# Patient Record
Sex: FEMALE | Employment: OTHER | URBAN - METROPOLITAN AREA
[De-identification: names, ages, dates, MRNs, and addresses within clinical notes are randomized per-mention and may not be internally consistent; named-entity substitution may affect disease eponyms.]

---

## 2021-02-23 VITALS — WEIGHT: 175 LBS | BODY MASS INDEX: 29.16 KG/M2 | HEIGHT: 65 IN

## 2021-02-23 RX ORDER — SODIUM CHLORIDE, SODIUM LACTATE, POTASSIUM CHLORIDE, CALCIUM CHLORIDE 600; 310; 30; 20 MG/100ML; MG/100ML; MG/100ML; MG/100ML
INJECTION, SOLUTION INTRAVENOUS CONTINUOUS
Status: CANCELLED | OUTPATIENT
Start: 2021-02-23

## 2021-02-23 RX ORDER — AMANTADINE HYDROCHLORIDE 100 MG/1
100 CAPSULE, GELATIN COATED ORAL 2 TIMES DAILY
COMMUNITY

## 2021-02-23 RX ORDER — LIDOCAINE HYDROCHLORIDE 10 MG/ML
0.5 INJECTION, SOLUTION EPIDURAL; INFILTRATION; INTRACAUDAL; PERINEURAL ONCE
Status: CANCELLED | OUTPATIENT
Start: 2021-02-23 | End: 2021-02-23

## 2021-02-23 RX ORDER — SELEGILINE HYDROCHLORIDE 5 MG/1
5 CAPSULE ORAL
COMMUNITY

## 2021-02-23 NOTE — PROGRESS NOTES
Name_______________________________________Printed:____________________  Date and time of surgery___3/23  1115_____________________Arrival Time:__0945______________   1. The instructions given regarding when and if a patient needs to stop oral intake prior to surgery varies. Follow the specific instructions you were given                  _x__Nothing to eat or to drink after Midnight the night before.                   ____Carbo loading or ERAS instructions will be given to select patients-if you have been given those instructions -please do the following                           The evening before your surgery after dinner before midnight drink 40 ounces of gatorade. If you are diabetic use sugar free. The morning of surgery drink 40 ounces of water. This needs to be finished 3 hours prior to your surgery start time. 2. Take the following pills with a small sip of water on the morning of surgery___symmetrel selegiline________________________________________________                  Do not take blood pressure medications ending in pril or sartan the edna prior to surgery or the morning of surgery_   3. Aspirin, Ibuprofen, Advil, Naproxen, Vitamin E and other Anti-inflammatory products and supplements should be stopped for 5 -7days before surgery or as directed by your physician. 4. Check with your Doctor regarding stopping Plavix, Coumadin,Eliquis, Lovenox,Effient,Pradaxa,Xarelto, Fragmin or other blood thinners and follow their instructions. 5. Do not smoke, and do not drink any alcoholic beverages 24 hours prior to surgery. This includes NA Beer. Refrain from the usage of any recreational drugs. 6. You may brush your teeth and gargle the morning of surgery. DO NOT SWALLOW WATER   7. You MUST make arrangements for a responsible adult to stay on site while you are here and take you home after your surgery. You will not be allowed to leave alone or drive yourself home.   It is strongly suggested someone stay with you the first 24 hrs. Your surgery will be cancelled if you do not have a ride home. 8. A parent/legal guardian must accompany a child scheduled for surgery and plan to stay at the hospital until the child is discharged. Please do not bring other children with you. 9. Please wear simple, loose fitting clothing to the hospital.  Gerri Roots not bring valuables (money, credit cards, checkbooks, etc.) Do not wear any makeup (including no eye makeup) or nail polish on your fingers or toes. 10. DO NOT wear any jewelry or piercings on day of surgery. All body piercing jewelry must be removed. 11. If you have ___dentures, they will be removed before going to the OR; we will provide you a container. If you wear ___contact lenses or ___glasses, they will be removed; please bring a case for them. 12. Please see your family doctor/pediatrician for a history & physical and/or concerning medications. Bring any test results/reports from your physician's office. PCP__________________Phone___________H&P Appt. Date________             13 If you  have a Living Will and Durable Power of  for Healthcare, please bring in a copy. 15. Notify your Surgeon if you develop any illness between now and surgery  time, cough, cold, fever, sore throat, nausea, vomiting, etc.  Please notify your surgeon if you experience dizziness, shortness of breath or blurred vision between now & the time of your surgery             15. DO NOT shave your operative site 96 hours prior to surgery. For face & neck surgery, men may use an electric razor 48 hours prior to surgery. 16. Shower the night before or morning of surgery using an antibacterial soap or as you have been instructed. 17. To provide excellent care visitors will be limited to one in the room at any given time. 18.  Please bring picture ID and insurance card.              19.  Visit our web site for additional information:  [email or web address]              20.During flu season no children under the age of 15 are permitted in the hospital for the safety of all patients. 21. If you take a long acting insulin in the evening only  take half of your usual  dose the night  before your procedure              22. If you use a c-pap please bring DOS if staying overnight,             23.For your convenience [facility] has a pharmacy on site to fill your prescriptions. 24. If you use oxygen and have a portable tank please bring it  with you the DOS             25. Bring a complete list of all your medications with name and dose include any supplements. 26. Other___pcp to make appt  3/17 pats_______________________________________   *Please call pre admission testing if you any further questions   Eloy Quiroz   [address]    [address]  [phone]   [facility]    __ Frank Julia _____  _x_ Scheduled _3/17__ Mart Frame to schedule__   __ Other __________      WIBZAEV POLICY(subject to change)    There is a one visitor policy at [facility] for all surgeries and endoscopies. Whether the visitor can stay or will be asked to wait in the car will depend on the current policy and if social distancing can be maintained. The policy is subject to change at any time. Please make sure the visitor has a cell phone that is on,charged and able to accept calls, as this may be the way that the staff communicates with them. Pain management is NO VISITOR policyThe patients ride is expected to remain in the car with a cell phone for communication. If the ride is leaving the hospital grounds please make sure they are back in time for pickup. Have the patient inform the staff on arrival what their rides plans are while the patient is in the facility. At the MAIN there is one visitor allowed. Please note that the visitor

## 2021-03-17 ENCOUNTER — OFFICE VISIT (OUTPATIENT)
Dept: PRIMARY CARE CLINIC | Age: 76
End: 2021-03-17
Payer: MEDICARE

## 2021-03-17 ENCOUNTER — HOSPITAL ENCOUNTER (OUTPATIENT)
Age: 76
Setting detail: OUTPATIENT SURGERY
End: 2021-03-17
Attending: NEUROLOGICAL SURGERY | Admitting: NEUROLOGICAL SURGERY
Payer: MEDICARE

## 2021-03-17 ENCOUNTER — HOSPITAL ENCOUNTER (OUTPATIENT)
Age: 76
Discharge: HOME | End: 2021-03-17
Attending: NEUROLOGICAL SURGERY
Payer: MEDICARE

## 2021-03-17 DIAGNOSIS — Z01.818 PREOP TESTING: ICD-10-CM

## 2021-03-17 DIAGNOSIS — Z01.818 PREOP TESTING: Primary | ICD-10-CM

## 2021-03-17 DIAGNOSIS — Z20.828 EXPOSURE TO SARS-ASSOCIATED CORONAVIRUS: Primary | ICD-10-CM

## 2021-03-17 LAB
ANION GAP SERPL CALCULATED.3IONS-SCNC: 13 MMOL/L (ref 3–16)
APTT: 38.3 SEC (ref 24.2–36.2)
BUN BLDV-MCNC: 31 MG/DL (ref 7–20)
CALCIUM SERPL-MCNC: 9.3 MG/DL (ref 8.3–10.6)
CHLORIDE BLD-SCNC: 106 MMOL/L (ref 99–110)
CO2: 20 MMOL/L (ref 21–32)
CREAT SERPL-MCNC: 1.5 MG/DL (ref 0.6–1.2)
EKG ATRIAL RATE: 55 BPM
EKG DIAGNOSIS: NORMAL
EKG P AXIS: 75 DEGREES
EKG P-R INTERVAL: 138 MS
EKG Q-T INTERVAL: 468 MS
EKG QRS DURATION: 96 MS
EKG QTC CALCULATION (BAZETT): 447 MS
EKG R AXIS: -23 DEGREES
EKG T AXIS: 33 DEGREES
EKG VENTRICULAR RATE: 55 BPM
GFR AFRICAN AMERICAN: 41
GFR NON-AFRICAN AMERICAN: 34
GLUCOSE BLD-MCNC: 103 MG/DL (ref 70–99)
HCT VFR BLD CALC: 42.3 % (ref 36–48)
HEMOGLOBIN: 14 G/DL (ref 12–16)
INR BLD: 1.14 (ref 0.86–1.14)
MCH RBC QN AUTO: 34.1 PG (ref 26–34)
MCHC RBC AUTO-ENTMCNC: 33 G/DL (ref 31–36)
MCV RBC AUTO: 103.4 FL (ref 80–100)
PDW BLD-RTO: 13.5 % (ref 12.4–15.4)
PLATELET # BLD: 106 K/UL (ref 135–450)
PMV BLD AUTO: 12.6 FL (ref 5–10.5)
POTASSIUM SERPL-SCNC: 4.5 MMOL/L (ref 3.5–5.1)
PROTHROMBIN TIME: 13.3 SEC (ref 10–13.2)
RBC # BLD: 4.09 M/UL (ref 4–5.2)
SODIUM BLD-SCNC: 139 MMOL/L (ref 136–145)
WBC # BLD: 5.6 K/UL (ref 4–11)

## 2021-03-17 PROCEDURE — 93010 ELECTROCARDIOGRAM REPORT: CPT | Performed by: INTERNAL MEDICINE

## 2021-03-17 PROCEDURE — 99211 OFF/OP EST MAY X REQ PHY/QHP: CPT | Performed by: NURSE PRACTITIONER

## 2021-03-17 PROCEDURE — 80048 BASIC METABOLIC PNL TOTAL CA: CPT

## 2021-03-17 PROCEDURE — 93005 ELECTROCARDIOGRAM TRACING: CPT | Performed by: NEUROLOGICAL SURGERY

## 2021-03-17 PROCEDURE — 36415 COLL VENOUS BLD VENIPUNCTURE: CPT

## 2021-03-17 PROCEDURE — 85610 PROTHROMBIN TIME: CPT

## 2021-03-17 PROCEDURE — G8419 CALC BMI OUT NRM PARAM NOF/U: HCPCS | Performed by: NURSE PRACTITIONER

## 2021-03-17 PROCEDURE — G8428 CUR MEDS NOT DOCUMENT: HCPCS | Performed by: NURSE PRACTITIONER

## 2021-03-17 PROCEDURE — 85730 THROMBOPLASTIN TIME PARTIAL: CPT

## 2021-03-17 PROCEDURE — 85027 COMPLETE CBC AUTOMATED: CPT

## 2021-03-18 LAB — SARS-COV-2, PCR: NOT DETECTED

## 2021-03-22 ENCOUNTER — ANESTHESIA EVENT (OUTPATIENT)
Dept: OPERATING ROOM | Age: 76
End: 2021-03-22
Payer: MEDICARE

## 2021-03-22 NOTE — PROGRESS NOTES
Gely Mosqueda received a viral test for COVID-19. They were educated on isolation and quarantine as appropriate. For any symptoms, they were directed to seek care from their PCP, given contact information to establish with a doctor, directed to an urgent care or the emergency room.

## 2021-03-23 ENCOUNTER — APPOINTMENT (OUTPATIENT)
Dept: GENERAL RADIOLOGY | Age: 76
End: 2021-03-23
Attending: NEUROLOGICAL SURGERY
Payer: MEDICARE

## 2021-03-23 ENCOUNTER — ANESTHESIA (OUTPATIENT)
Dept: OPERATING ROOM | Age: 76
End: 2021-03-23
Payer: MEDICARE

## 2021-03-23 ENCOUNTER — HOSPITAL ENCOUNTER (OUTPATIENT)
Age: 76
Setting detail: OBSERVATION
Discharge: HOME | End: 2021-03-24
Attending: NEUROLOGICAL SURGERY | Admitting: NEUROLOGICAL SURGERY
Payer: MEDICARE

## 2021-03-23 VITALS
RESPIRATION RATE: 10 BRPM | TEMPERATURE: 96.3 F | OXYGEN SATURATION: 100 % | DIASTOLIC BLOOD PRESSURE: 90 MMHG | SYSTOLIC BLOOD PRESSURE: 202 MMHG

## 2021-03-23 DIAGNOSIS — M48.062 SPINAL STENOSIS OF LUMBAR REGION WITH NEUROGENIC CLAUDICATION: Primary | ICD-10-CM

## 2021-03-23 PROBLEM — K21.9 GERD (GASTROESOPHAGEAL REFLUX DISEASE): Status: ACTIVE | Noted: 2021-03-23

## 2021-03-23 PROBLEM — G20 PARKINSONISM (HCC): Status: ACTIVE | Noted: 2021-03-23

## 2021-03-23 PROBLEM — I10 HTN (HYPERTENSION): Status: ACTIVE | Noted: 2021-03-23

## 2021-03-23 PROCEDURE — 2500000003 HC RX 250 WO HCPCS: Performed by: NEUROLOGICAL SURGERY

## 2021-03-23 PROCEDURE — 6360000002 HC RX W HCPCS: Performed by: REGISTERED NURSE

## 2021-03-23 PROCEDURE — 3600000004 HC SURGERY LEVEL 4 BASE: Performed by: NEUROLOGICAL SURGERY

## 2021-03-23 PROCEDURE — 7100000000 HC PACU RECOVERY - FIRST 15 MIN: Performed by: NEUROLOGICAL SURGERY

## 2021-03-23 PROCEDURE — 2720000010 HC SURG SUPPLY STERILE: Performed by: NEUROLOGICAL SURGERY

## 2021-03-23 PROCEDURE — 94760 N-INVAS EAR/PLS OXIMETRY 1: CPT

## 2021-03-23 PROCEDURE — 3700000001 HC ADD 15 MINUTES (ANESTHESIA): Performed by: NEUROLOGICAL SURGERY

## 2021-03-23 PROCEDURE — G0378 HOSPITAL OBSERVATION PER HR: HCPCS

## 2021-03-23 PROCEDURE — 3209999900 FLUORO FOR SURGICAL PROCEDURES

## 2021-03-23 PROCEDURE — 2580000003 HC RX 258: Performed by: NEUROLOGICAL SURGERY

## 2021-03-23 PROCEDURE — 6360000002 HC RX W HCPCS: Performed by: NEUROLOGICAL SURGERY

## 2021-03-23 PROCEDURE — 3600000014 HC SURGERY LEVEL 4 ADDTL 15MIN: Performed by: NEUROLOGICAL SURGERY

## 2021-03-23 PROCEDURE — 6370000000 HC RX 637 (ALT 250 FOR IP): Performed by: NEUROLOGICAL SURGERY

## 2021-03-23 PROCEDURE — 2500000003 HC RX 250 WO HCPCS

## 2021-03-23 PROCEDURE — 6360000002 HC RX W HCPCS: Performed by: ANESTHESIOLOGY

## 2021-03-23 PROCEDURE — 2709999900 HC NON-CHARGEABLE SUPPLY: Performed by: NEUROLOGICAL SURGERY

## 2021-03-23 PROCEDURE — 2500000003 HC RX 250 WO HCPCS: Performed by: REGISTERED NURSE

## 2021-03-23 PROCEDURE — 72020 X-RAY EXAM OF SPINE 1 VIEW: CPT

## 2021-03-23 PROCEDURE — 7100000001 HC PACU RECOVERY - ADDTL 15 MIN: Performed by: NEUROLOGICAL SURGERY

## 2021-03-23 PROCEDURE — 3700000000 HC ANESTHESIA ATTENDED CARE: Performed by: NEUROLOGICAL SURGERY

## 2021-03-23 RX ORDER — HYDROMORPHONE HYDROCHLORIDE 1 MG/ML
0.5 INJECTION, SOLUTION INTRAMUSCULAR; INTRAVENOUS; SUBCUTANEOUS
Status: DISCONTINUED | OUTPATIENT
Start: 2021-03-23 | End: 2021-03-24 | Stop reason: HOSPADM

## 2021-03-23 RX ORDER — PANTOPRAZOLE SODIUM 40 MG/1
40 TABLET, DELAYED RELEASE ORAL EVERY MORNING
Status: DISCONTINUED | OUTPATIENT
Start: 2021-03-24 | End: 2021-03-24

## 2021-03-23 RX ORDER — MAGNESIUM HYDROXIDE 1200 MG/15ML
LIQUID ORAL CONTINUOUS PRN
Status: COMPLETED | OUTPATIENT
Start: 2021-03-23 | End: 2021-03-23

## 2021-03-23 RX ORDER — POLYETHYLENE GLYCOL 3350 17 G/17G
17 POWDER, FOR SOLUTION ORAL DAILY
Status: DISCONTINUED | OUTPATIENT
Start: 2021-03-23 | End: 2021-03-24 | Stop reason: HOSPADM

## 2021-03-23 RX ORDER — METHYLPREDNISOLONE ACETATE 40 MG/ML
INJECTION, SUSPENSION INTRA-ARTICULAR; INTRALESIONAL; INTRAMUSCULAR; SOFT TISSUE
Status: COMPLETED | OUTPATIENT
Start: 2021-03-23 | End: 2021-03-23

## 2021-03-23 RX ORDER — SELEGILINE HYDROCHLORIDE 5 MG/1
5 CAPSULE ORAL
Status: DISCONTINUED | OUTPATIENT
Start: 2021-03-24 | End: 2021-03-24 | Stop reason: HOSPADM

## 2021-03-23 RX ORDER — CARVEDILOL 3.12 MG/1
3.12 TABLET ORAL 2 TIMES DAILY WITH MEALS
Status: DISCONTINUED | OUTPATIENT
Start: 2021-03-23 | End: 2021-03-24 | Stop reason: HOSPADM

## 2021-03-23 RX ORDER — HYDRALAZINE HYDROCHLORIDE 20 MG/ML
5 INJECTION INTRAMUSCULAR; INTRAVENOUS EVERY 10 MIN PRN
Status: DISCONTINUED | OUTPATIENT
Start: 2021-03-23 | End: 2021-03-23 | Stop reason: HOSPADM

## 2021-03-23 RX ORDER — HYDROMORPHONE HCL 110MG/55ML
PATIENT CONTROLLED ANALGESIA SYRINGE INTRAVENOUS PRN
Status: DISCONTINUED | OUTPATIENT
Start: 2021-03-23 | End: 2021-03-23 | Stop reason: SDUPTHER

## 2021-03-23 RX ORDER — HYDROMORPHONE HCL 110MG/55ML
0.5 PATIENT CONTROLLED ANALGESIA SYRINGE INTRAVENOUS EVERY 5 MIN PRN
Status: DISCONTINUED | OUTPATIENT
Start: 2021-03-23 | End: 2021-03-23 | Stop reason: HOSPADM

## 2021-03-23 RX ORDER — FENTANYL CITRATE 50 UG/ML
INJECTION, SOLUTION INTRAMUSCULAR; INTRAVENOUS PRN
Status: DISCONTINUED | OUTPATIENT
Start: 2021-03-23 | End: 2021-03-23 | Stop reason: SDUPTHER

## 2021-03-23 RX ORDER — LIDOCAINE HYDROCHLORIDE 10 MG/ML
1 INJECTION, SOLUTION EPIDURAL; INFILTRATION; INTRACAUDAL; PERINEURAL
Status: DISCONTINUED | OUTPATIENT
Start: 2021-03-23 | End: 2021-03-23 | Stop reason: HOSPADM

## 2021-03-23 RX ORDER — SODIUM CHLORIDE 0.9 % (FLUSH) 0.9 %
10 SYRINGE (ML) INJECTION EVERY 12 HOURS SCHEDULED
Status: DISCONTINUED | OUTPATIENT
Start: 2021-03-23 | End: 2021-03-24 | Stop reason: HOSPADM

## 2021-03-23 RX ORDER — GLYCOPYRROLATE 0.2 MG/ML
INJECTION INTRAMUSCULAR; INTRAVENOUS PRN
Status: DISCONTINUED | OUTPATIENT
Start: 2021-03-23 | End: 2021-03-23 | Stop reason: SDUPTHER

## 2021-03-23 RX ORDER — OXYCODONE HYDROCHLORIDE AND ACETAMINOPHEN 5; 325 MG/1; MG/1
1 TABLET ORAL
Status: DISCONTINUED | OUTPATIENT
Start: 2021-03-23 | End: 2021-03-23 | Stop reason: HOSPADM

## 2021-03-23 RX ORDER — OXYCODONE HYDROCHLORIDE 5 MG/1
10 TABLET ORAL EVERY 4 HOURS PRN
Status: DISCONTINUED | OUTPATIENT
Start: 2021-03-23 | End: 2021-03-24 | Stop reason: HOSPADM

## 2021-03-23 RX ORDER — LABETALOL HYDROCHLORIDE 5 MG/ML
5 INJECTION, SOLUTION INTRAVENOUS EVERY 10 MIN PRN
Status: DISCONTINUED | OUTPATIENT
Start: 2021-03-23 | End: 2021-03-23 | Stop reason: HOSPADM

## 2021-03-23 RX ORDER — SODIUM CHLORIDE 0.9 % (FLUSH) 0.9 %
10 SYRINGE (ML) INJECTION PRN
Status: DISCONTINUED | OUTPATIENT
Start: 2021-03-23 | End: 2021-03-23 | Stop reason: HOSPADM

## 2021-03-23 RX ORDER — DEXAMETHASONE SODIUM PHOSPHATE 4 MG/ML
INJECTION, SOLUTION INTRA-ARTICULAR; INTRALESIONAL; INTRAMUSCULAR; INTRAVENOUS; SOFT TISSUE PRN
Status: DISCONTINUED | OUTPATIENT
Start: 2021-03-23 | End: 2021-03-23 | Stop reason: SDUPTHER

## 2021-03-23 RX ORDER — DEXTROSE, SODIUM CHLORIDE, AND POTASSIUM CHLORIDE 5; .45; .15 G/100ML; G/100ML; G/100ML
1000 INJECTION INTRAVENOUS CONTINUOUS
Status: DISCONTINUED | OUTPATIENT
Start: 2021-03-23 | End: 2021-03-24

## 2021-03-23 RX ORDER — TIZANIDINE 4 MG/1
4 TABLET ORAL EVERY 6 HOURS PRN
Status: DISCONTINUED | OUTPATIENT
Start: 2021-03-23 | End: 2021-03-24 | Stop reason: HOSPADM

## 2021-03-23 RX ORDER — PROMETHAZINE HYDROCHLORIDE 25 MG/1
12.5 TABLET ORAL EVERY 6 HOURS PRN
Status: DISCONTINUED | OUTPATIENT
Start: 2021-03-23 | End: 2021-03-24 | Stop reason: HOSPADM

## 2021-03-23 RX ORDER — PROPOFOL 10 MG/ML
INJECTION, EMULSION INTRAVENOUS PRN
Status: DISCONTINUED | OUTPATIENT
Start: 2021-03-23 | End: 2021-03-23 | Stop reason: SDUPTHER

## 2021-03-23 RX ORDER — ONDANSETRON 2 MG/ML
INJECTION INTRAMUSCULAR; INTRAVENOUS PRN
Status: DISCONTINUED | OUTPATIENT
Start: 2021-03-23 | End: 2021-03-23 | Stop reason: SDUPTHER

## 2021-03-23 RX ORDER — LIDOCAINE HYDROCHLORIDE 20 MG/ML
INJECTION, SOLUTION EPIDURAL; INFILTRATION; INTRACAUDAL; PERINEURAL PRN
Status: DISCONTINUED | OUTPATIENT
Start: 2021-03-23 | End: 2021-03-23 | Stop reason: SDUPTHER

## 2021-03-23 RX ORDER — ONDANSETRON 2 MG/ML
4 INJECTION INTRAMUSCULAR; INTRAVENOUS
Status: DISCONTINUED | OUTPATIENT
Start: 2021-03-23 | End: 2021-03-23 | Stop reason: HOSPADM

## 2021-03-23 RX ORDER — BUPIVACAINE HYDROCHLORIDE 5 MG/ML
INJECTION, SOLUTION EPIDURAL; INTRACAUDAL
Status: COMPLETED | OUTPATIENT
Start: 2021-03-23 | End: 2021-03-23

## 2021-03-23 RX ORDER — MEPERIDINE HYDROCHLORIDE 25 MG/ML
12.5 INJECTION INTRAMUSCULAR; INTRAVENOUS; SUBCUTANEOUS EVERY 5 MIN PRN
Status: DISCONTINUED | OUTPATIENT
Start: 2021-03-23 | End: 2021-03-23 | Stop reason: HOSPADM

## 2021-03-23 RX ORDER — OXYCODONE HYDROCHLORIDE 5 MG/1
5 TABLET ORAL EVERY 6 HOURS PRN
Qty: 30 TABLET | Refills: 0 | Status: SHIPPED | OUTPATIENT
Start: 2021-03-23 | End: 2021-03-30

## 2021-03-23 RX ORDER — LABETALOL HYDROCHLORIDE 5 MG/ML
INJECTION, SOLUTION INTRAVENOUS PRN
Status: DISCONTINUED | OUTPATIENT
Start: 2021-03-23 | End: 2021-03-23 | Stop reason: SDUPTHER

## 2021-03-23 RX ORDER — DEXTROSE, SODIUM CHLORIDE, AND POTASSIUM CHLORIDE 5; .45; .15 G/100ML; G/100ML; G/100ML
INJECTION INTRAVENOUS
Status: COMPLETED
Start: 2021-03-23 | End: 2021-03-23

## 2021-03-23 RX ORDER — SODIUM CHLORIDE 0.9 % (FLUSH) 0.9 %
10 SYRINGE (ML) INJECTION EVERY 12 HOURS SCHEDULED
Status: DISCONTINUED | OUTPATIENT
Start: 2021-03-23 | End: 2021-03-23 | Stop reason: HOSPADM

## 2021-03-23 RX ORDER — ONDANSETRON 2 MG/ML
4 INJECTION INTRAMUSCULAR; INTRAVENOUS EVERY 6 HOURS PRN
Status: DISCONTINUED | OUTPATIENT
Start: 2021-03-23 | End: 2021-03-24 | Stop reason: HOSPADM

## 2021-03-23 RX ORDER — CARVEDILOL 3.12 MG/1
3.12 TABLET ORAL 2 TIMES DAILY WITH MEALS
COMMUNITY

## 2021-03-23 RX ORDER — SODIUM CHLORIDE, SODIUM LACTATE, POTASSIUM CHLORIDE, CALCIUM CHLORIDE 600; 310; 30; 20 MG/100ML; MG/100ML; MG/100ML; MG/100ML
INJECTION, SOLUTION INTRAVENOUS CONTINUOUS
Status: DISCONTINUED | OUTPATIENT
Start: 2021-03-23 | End: 2021-03-23

## 2021-03-23 RX ORDER — OXYCODONE HYDROCHLORIDE 5 MG/1
5 TABLET ORAL EVERY 4 HOURS PRN
Status: DISCONTINUED | OUTPATIENT
Start: 2021-03-23 | End: 2021-03-24 | Stop reason: HOSPADM

## 2021-03-23 RX ORDER — LIDOCAINE HYDROCHLORIDE 10 MG/ML
0.5 INJECTION, SOLUTION EPIDURAL; INFILTRATION; INTRACAUDAL; PERINEURAL ONCE
Status: DISCONTINUED | OUTPATIENT
Start: 2021-03-23 | End: 2021-03-23 | Stop reason: HOSPADM

## 2021-03-23 RX ORDER — CYCLOSPORINE 0.5 MG/ML
1 EMULSION OPHTHALMIC 2 TIMES DAILY
Status: DISCONTINUED | OUTPATIENT
Start: 2021-03-23 | End: 2021-03-23 | Stop reason: ALTCHOICE

## 2021-03-23 RX ORDER — SUCCINYLCHOLINE CHLORIDE 20 MG/ML
INJECTION INTRAMUSCULAR; INTRAVENOUS PRN
Status: DISCONTINUED | OUTPATIENT
Start: 2021-03-23 | End: 2021-03-23 | Stop reason: SDUPTHER

## 2021-03-23 RX ORDER — SODIUM CHLORIDE 0.9 % (FLUSH) 0.9 %
10 SYRINGE (ML) INJECTION PRN
Status: DISCONTINUED | OUTPATIENT
Start: 2021-03-23 | End: 2021-03-24 | Stop reason: HOSPADM

## 2021-03-23 RX ORDER — ROCURONIUM BROMIDE 10 MG/ML
INJECTION, SOLUTION INTRAVENOUS PRN
Status: DISCONTINUED | OUTPATIENT
Start: 2021-03-23 | End: 2021-03-23 | Stop reason: SDUPTHER

## 2021-03-23 RX ORDER — METHOCARBAMOL 100 MG/ML
INJECTION, SOLUTION INTRAMUSCULAR; INTRAVENOUS PRN
Status: DISCONTINUED | OUTPATIENT
Start: 2021-03-23 | End: 2021-03-23 | Stop reason: SDUPTHER

## 2021-03-23 RX ORDER — LISINOPRIL 20 MG/1
20 TABLET ORAL DAILY
Status: DISCONTINUED | OUTPATIENT
Start: 2021-03-23 | End: 2021-03-24 | Stop reason: HOSPADM

## 2021-03-23 RX ORDER — CALCIUM CARBONATE 500(1250)
1500 TABLET ORAL DAILY
Status: DISCONTINUED | OUTPATIENT
Start: 2021-03-23 | End: 2021-03-24 | Stop reason: HOSPADM

## 2021-03-23 RX ADMIN — GLYCOPYRROLATE 0.2 MG: 0.2 INJECTION, SOLUTION INTRAMUSCULAR; INTRAVENOUS at 12:47

## 2021-03-23 RX ADMIN — CEFAZOLIN SODIUM 2000 MG: 10 INJECTION, POWDER, FOR SOLUTION INTRAVENOUS at 20:35

## 2021-03-23 RX ADMIN — SODIUM CHLORIDE, POTASSIUM CHLORIDE, SODIUM LACTATE AND CALCIUM CHLORIDE: 600; 310; 30; 20 INJECTION, SOLUTION INTRAVENOUS at 11:27

## 2021-03-23 RX ADMIN — HYDROMORPHONE HYDROCHLORIDE 0.5 MG: 2 INJECTION, SOLUTION INTRAMUSCULAR; INTRAVENOUS; SUBCUTANEOUS at 14:25

## 2021-03-23 RX ADMIN — ROCURONIUM BROMIDE 50 MG: 10 INJECTION, SOLUTION INTRAVENOUS at 11:39

## 2021-03-23 RX ADMIN — LABETALOL HYDROCHLORIDE 5 MG: 5 INJECTION, SOLUTION INTRAVENOUS at 13:40

## 2021-03-23 RX ADMIN — ONDANSETRON 4 MG: 2 INJECTION INTRAMUSCULAR; INTRAVENOUS at 13:19

## 2021-03-23 RX ADMIN — HYDROMORPHONE HYDROCHLORIDE 0.5 MG: 2 INJECTION, SOLUTION INTRAMUSCULAR; INTRAVENOUS; SUBCUTANEOUS at 13:06

## 2021-03-23 RX ADMIN — METHOCARBAMOL 500 MG: 100 INJECTION INTRAMUSCULAR; INTRAVENOUS at 11:53

## 2021-03-23 RX ADMIN — PROPOFOL 120 MG: 10 INJECTION, EMULSION INTRAVENOUS at 11:30

## 2021-03-23 RX ADMIN — LIDOCAINE HYDROCHLORIDE 80 MG: 20 INJECTION, SOLUTION EPIDURAL; INFILTRATION; INTRACAUDAL; PERINEURAL at 11:30

## 2021-03-23 RX ADMIN — SUGAMMADEX 200 MG: 100 INJECTION, SOLUTION INTRAVENOUS at 13:27

## 2021-03-23 RX ADMIN — HYDROMORPHONE HYDROCHLORIDE 0.5 MG: 2 INJECTION, SOLUTION INTRAMUSCULAR; INTRAVENOUS; SUBCUTANEOUS at 13:16

## 2021-03-23 RX ADMIN — LIDOCAINE HYDROCHLORIDE 20 MG: 20 INJECTION, SOLUTION EPIDURAL; INFILTRATION; INTRACAUDAL; PERINEURAL at 13:33

## 2021-03-23 RX ADMIN — ROCURONIUM BROMIDE 10 MG: 10 INJECTION, SOLUTION INTRAVENOUS at 12:45

## 2021-03-23 RX ADMIN — PROPOFOL 30 MG: 10 INJECTION, EMULSION INTRAVENOUS at 12:02

## 2021-03-23 RX ADMIN — HYDRALAZINE HYDROCHLORIDE 5 MG: 20 INJECTION, SOLUTION INTRAMUSCULAR; INTRAVENOUS at 14:27

## 2021-03-23 RX ADMIN — FENTANYL CITRATE 50 MCG: 50 INJECTION, SOLUTION INTRAMUSCULAR; INTRAVENOUS at 11:30

## 2021-03-23 RX ADMIN — POTASSIUM CHLORIDE, DEXTROSE MONOHYDRATE AND SODIUM CHLORIDE 1000 ML: 150; 5; 450 INJECTION, SOLUTION INTRAVENOUS at 14:56

## 2021-03-23 RX ADMIN — HYDRALAZINE HYDROCHLORIDE 5 MG: 20 INJECTION, SOLUTION INTRAMUSCULAR; INTRAVENOUS at 14:06

## 2021-03-23 RX ADMIN — DEXAMETHASONE SODIUM PHOSPHATE 10 MG: 4 INJECTION, SOLUTION INTRAMUSCULAR; INTRAVENOUS at 11:39

## 2021-03-23 RX ADMIN — CEFAZOLIN SODIUM 2000 MG: 10 INJECTION, POWDER, FOR SOLUTION INTRAVENOUS at 11:23

## 2021-03-23 RX ADMIN — SUCCINYLCHOLINE CHLORIDE 100 MG: 20 INJECTION, SOLUTION INTRAMUSCULAR; INTRAVENOUS at 11:30

## 2021-03-23 RX ADMIN — CARVEDILOL 3.12 MG: 3.12 TABLET, FILM COATED ORAL at 17:49

## 2021-03-23 RX ADMIN — OXYCODONE 10 MG: 5 TABLET ORAL at 18:02

## 2021-03-23 RX ADMIN — SODIUM CHLORIDE, POTASSIUM CHLORIDE, SODIUM LACTATE AND CALCIUM CHLORIDE: 600; 310; 30; 20 INJECTION, SOLUTION INTRAVENOUS at 11:02

## 2021-03-23 RX ADMIN — LISINOPRIL 20 MG: 20 TABLET ORAL at 17:49

## 2021-03-23 RX ADMIN — FENTANYL CITRATE 50 MCG: 50 INJECTION, SOLUTION INTRAMUSCULAR; INTRAVENOUS at 11:42

## 2021-03-23 ASSESSMENT — PULMONARY FUNCTION TESTS
PIF_VALUE: 15
PIF_VALUE: 15
PIF_VALUE: 17
PIF_VALUE: 16
PIF_VALUE: 17
PIF_VALUE: 0
PIF_VALUE: 16
PIF_VALUE: 17
PIF_VALUE: 16
PIF_VALUE: 1
PIF_VALUE: 15
PIF_VALUE: 16
PIF_VALUE: 16
PIF_VALUE: 15
PIF_VALUE: 16
PIF_VALUE: 15
PIF_VALUE: 16
PIF_VALUE: 15
PIF_VALUE: 15
PIF_VALUE: 3
PIF_VALUE: 16
PIF_VALUE: 4
PIF_VALUE: 15
PIF_VALUE: 16
PIF_VALUE: 16
PIF_VALUE: 15
PIF_VALUE: 3
PIF_VALUE: 16
PIF_VALUE: 15
PIF_VALUE: 15
PIF_VALUE: 0
PIF_VALUE: 15
PIF_VALUE: 15
PIF_VALUE: 16
PIF_VALUE: 16
PIF_VALUE: 4
PIF_VALUE: 17
PIF_VALUE: 1
PIF_VALUE: 16
PIF_VALUE: 16
PIF_VALUE: 15
PIF_VALUE: 16
PIF_VALUE: 15
PIF_VALUE: 0
PIF_VALUE: 16
PIF_VALUE: 15
PIF_VALUE: 16
PIF_VALUE: 0
PIF_VALUE: 16
PIF_VALUE: 16
PIF_VALUE: 15

## 2021-03-23 ASSESSMENT — PAIN SCALES - GENERAL
PAINLEVEL_OUTOF10: 6
PAINLEVEL_OUTOF10: 0
PAINLEVEL_OUTOF10: 10

## 2021-03-23 ASSESSMENT — ENCOUNTER SYMPTOMS
SHORTNESS OF BREATH: 0
NAUSEA: 0
COUGH: 0
VOMITING: 0
EYE PAIN: 0
EYE REDNESS: 0

## 2021-03-23 ASSESSMENT — LIFESTYLE VARIABLES: SMOKING_STATUS: 0

## 2021-03-23 NOTE — PROGRESS NOTES
Patient asleep, wakes easily and falls right back to sleep, VSS, /48 after hydralazine, attempted to call son/ at [phone] with no answer, phase I discharge criteria met, seen by anesthesia, will transfer to T when room is clean.

## 2021-03-23 NOTE — ANESTHESIA POSTPROCEDURE EVALUATION
Department of Anesthesiology  Postprocedure Note    Patient: Ac Dodge  MRN: [id number]  YOB: 1945  Date of evaluation: 3/23/2021  Time:  2:04 PM     Procedure Summary     Date: 03/23/21 Room / Location: [address]    Anesthesia Start: 9086 Anesthesia Stop: 7803    Procedure: Mane Hark BILATERAL MICRO HEMILAMINECTOMY AND DISCECTOMY (45682, [id number]) (Bilateral ) Diagnosis: (M48.062  LUMBAR STENOSIS LUMBAR1-LUMBAR5 WITH NEUROGENIC CLAUDICATION)    Surgeons: Pablo Prescott MD Responsible Provider: Eva Whittaker MD    Anesthesia Type: general ASA Status: 3          Anesthesia Type: general    Jemma Phase I: Jemma Score: 10    Jemma Phase II:      Last vitals: Reviewed and per EMR flowsheets.        Anesthesia Post Evaluation    Patient location during evaluation: PACU  Patient participation: complete - patient participated  Level of consciousness: awake and alert  Airway patency: patent  Nausea & Vomiting: no vomiting and no nausea  Complications: no  Cardiovascular status: hemodynamically stable  Respiratory status: acceptable  Hydration status: stable

## 2021-03-23 NOTE — BRIEF OP NOTE
Brief Postoperative Note      Patient: Anand Menendez  YOB: 1945  MRN: [id number]    Date of Procedure: 3/23/2021    Pre-Op Diagnosis: M48.062  LUMBAR STENOSIS LUMBAR1-LUMBAR5 WITH NEUROGENIC CLAUDICATION    Post-Op Diagnosis: Same       Procedure(s):  [id number] BILATERAL MICRO HEMILAMINECTOMY AND DISCECTOMY (37946, [id number])    Surgeon(s):  Yuko Kent MD    Assistant:  First Assistant: Florencia Strong; [facility]    Anesthesia: General    Estimated Blood Loss (mL): less than 347     Complications: None    Specimens:   * No specimens in log *    Implants:  * No implants in log *      Drains:   Closed/Suction Drain Inferior;Right Back Bulb [address] (Active)       Findings: L45 stenosis    Electronically signed by Yuko Kent MD on 3/23/2021 at 1:36 PM

## 2021-03-23 NOTE — PROGRESS NOTES
Patient transferred to pacu from OR, responds to voice, hypertensive, low back dressing CDI, LUIS with bloody drainage, hydralazine given for elevated BP, will monitor.

## 2021-03-23 NOTE — CONSULTS
Consult -Internal Medicine  Dr. Jer Owens  3/23/2021      PCP: No primary care provider on file. Referring Physician: Lynn Sams MD    Code Status: No Order  Current Diet: No diet orders on file      Cc: Livan Oro is a71 y.o. female who presents with Spinal stenosis of lumbar region with neurogenic claudication. Active Hospital Problems    Diagnosis Date Noted    Spinal stenosis of lumbar region with neurogenic claudication [M48.062] 03/23/2021    HTN (hypertension) [I10] 03/23/2021    GERD (gastroesophageal reflux disease) [K21.9] 03/23/2021    Parkinsonism ([address] Garrett Cain 03/23/2021     HPI: Livan Oro has been admitted by Lynn Sams MD with intractable back pain. Pt is 75yo F with intractable back pain, constant in timing, aching in nature, rated 8/10 at worst.  It has not improved with meds, therapy. PAin is so severe that it limits her usual activities. As it has not gotten better, surgery is recommended    Pt has undergone L4-L5 decompressive laminectomy  without any apparentcomplications. Pt is doing well post operatively. Pain is controlled at this time. I have been asked to see the patient for evaluation of her internal medicine issues:  has a past medical history of Ataxia, DVT (deep venous thrombosis) ([address] GERD (gastroesophageal reflux disease), Hypertension, PE (pulmonary embolism), and Senile degeneration of brain, not elsewhere classified ([address] .  Home meds have been reveiwed and restartedas indicated. Pt denies having other complaints at this time. Electronic chart reviewed  Home meds reviewed and restarted as indicated  Op note, anesthesia flowsheet reviewed  Case d/w nursing       Problem list of hospitalization thus far:   Active Hospital Problems    Diagnosis    Spinal stenosis of lumbar region with neurogenic claudication [M48.062]    HTN (hypertension) [I10]    GERD (gastroesophageal reflux disease) [K21.9]    Parkinsonism ([address] [G20]         Review of Systems: (1 system for EPF, 2-9 for detailed, 10+ for comprehensive)  Review of Systems   Constitutional: Negative for chills and fever. HENT: Negative for ear discharge and ear pain. Eyes: Negative for pain and redness. Respiratory: Negative for cough and shortness of breath. Cardiovascular: Negative for chest pain and leg swelling. Gastrointestinal: Negative for nausea and vomiting. Endocrine: Negative for polydipsia and polyphagia. Genitourinary: Negative for frequency and urgency. Musculoskeletal: Negative for gait problem and neck pain. Skin: Negative for rash. Allergic/Immunologic: Negative for food allergies. Neurological: Positive for tremors. Negative for dizziness. Hematological: Does not bruise/bleed easily. Psychiatric/Behavioral: Negative for agitation and decreased concentration. Past Medical History:   Past Medical History:   Diagnosis Date    Ataxia     DVT (deep venous thrombosis) (HCC)     GERD (gastroesophageal reflux disease) 3/23/2021    Hypertension     PE (pulmonary embolism)     Senile degeneration of brain, not elsewhere classified ([facility]        Past Surgical History:   Past Surgical History:   Procedure Laterality Date    APPENDECTOMY      CHOLECYSTECTOMY      HYSTERECTOMY         Social History:   Social History     Tobacco History     Smoking Status  Never Smoker    Smokeless Tobacco Use  Never Used          Alcohol History     Alcohol Use Status  No          Drug Use     Drug Use Status  No          Sexual Activity     Sexually Active  Not Currently                Fam History: No family history on file. PFSH: The above PMHx, PSHx, SocHx, FamHx has been reviewed by myself. (1 area for detailed, 2-3 for comprehensive)      Code Status: No Order    Meds  following list ofhome medications from electronic chart has been reviewed by myself  Prior to Admission medications    Medication Sig Start Date End Date Taking?  Authorizing Provider   carvedilol (COREG) 3.125 MG tablet Take 3.125 mg by mouth 2 times daily (with meals)   Yes Historical Provider, MD   oxyCODONE (ROXICODONE) 5 MG immediate release tablet Take 1 tablet by mouth every 6 hours as needed for Pain for up to 7 days. Intended supply: 7 days.  Take lowest dose possible to manage pain 3/23/21 3/30/21 Yes Sanjuanita Piedra MD   selegiline (ELDEPRYL) 5 MG capsule Take 5 mg by mouth 2 times daily (before meals)   Yes Historical Provider, MD   lisinopril (PRINIVIL;ZESTRIL) 20 MG tablet Take 1 tablet by mouth daily 4/11/15  Yes Historical Provider, MD   omeprazole (PRILOSEC) 20 MG capsule Take 20 mg by mouth daily as needed    Yes Historical Provider, MD   tiZANidine (ZANAFLEX) 4 MG tablet Take 4 mg by mouth every 6 hours as needed   Yes Historical Provider, MD   calcium carbonate (OSCAL) 500 MG TABS tablet Take 1,500 mg by mouth daily   Yes Historical Provider, MD   Multiple Vitamins-Minerals (THERAPEUTIC MULTIVITAMIN-MINERALS) tablet Take 1 tablet by mouth daily   Yes Historical Provider, MD   amantadine (SYMMETREL) 100 MG capsule Take 100 mg by mouth 2 times daily    Historical Provider, MD   clobetasol (TEMOVATE) 0.05 % ointment  4/11/15   Historical Provider, MD   XARELTO 20 MG TABS tablet Take 1 tablet by mouth daily 4/29/15   Historical Provider, MD   amantadine (SYMMETREL) 100 MG capsule Take 100 mg by mouth 3 times daily    Historical Provider, MD   cycloSPORINE (RESTASIS) 0.05 % ophthalmic emulsion Place 1 drop into both eyes 2 times daily    Historical Provider, MD   Coenzyme Q10 (CO Q 10) 10 MG CAPS Take by mouth    Historical Provider, MD   Omega-3 Fatty Acids (FISH OIL) 1000 MG CAPS Take 2,000 mg by mouth daily    Historical Provider, MD         Allergies   Allergen Reactions    Ampicillin Diarrhea             EXAM: (2-7 system forEPF/Detailed, ?8 for Comprehensive)  BP (!) 145/48   Pulse 69   Temp 97.6 °F (36.4 °C) (Temporal)   Resp 15   SpO2 95%   Constitutional: vitals asabove: alert, appears stated age and cooperative  Psychiatric: normal insight and judgment, oriented to person, place, time, and general circumstances  Head: Normocephalic, without obvious abnormality, atraumatic  Eyes:lids and lashes normal, conjunctivae and sclerae normal and pupils equal, round, reactive to light and accomodation  EMNT: external ears normal, lips normal  Neck: no adenopathy, no carotid bruit and thyroid not enlarged, symmetric, no tenderness/mass/nodules   Respiratory: clear to auscultation and percussion bilaterally with normal respiratory effort  Cardiovascular: normal rate, regular rhythm, normal S1 and S2 and no carotid bruits  Gastrointestinal: soft, non-tender, non-distended, normal bowel sounds, no masses or organomegaly  Lymphatic:   Extremities: no edema, no clubbing  Skin: No rashes or nodules noted. Neurologic:    LABS:  Labs Reviewed - No data to display      IMAGING:  Imaging has been reviewed in the computerized chart  Xr Lumbar Spine 1 Vw    Result Date: 3/23/2021  Radiology exam is complete. No Radiologist dictation. Please follow up with ordering provider. Fluoro For Surgical Procedures    Result Date: 3/23/2021  Radiology exam is complete. No Radiologist dictation. Please follow up with ordering provider. EKG:            MEDICAL DECISION MAKING:    Principal Problem:    Spinal stenosis of lumbar region with neurogenic claudication -New Problem to me. Doing well post op  Plan: Continue on post-operative pathway. PT/OT to see patient. Continue pain control - on IV pain meds acutely post op. Work on transitioning to oral pain meds when possible. Will follow serial h/h to monitor for acute blood loss anemia - labs ordered for tomorrow. Active Problems:    HTN (hypertension) -Established problem. Stable. 145/48  Plan: Pt home BP meds reviewed and will be continued.  IV Hydralazine ordered for control of extremely high blood pressures   Will monitor labs to assess Creat/K for possible complications of medications. GERD (gastroesophageal reflux disease) -Established problem. Stable. Plan: stay on PPI    Parkinsonism ([address] -Established problem. Stable. On multiple meds, sx appear stable  Plan: cont same meds        Diagnoses as listed above, designated as new or established and plan outlined for each. Data Reviewed:   (1) Lab tests were reviewed or ordered. (1) Radiology tests were reviewed or ordered. (1) Medical test (Echo, EKG, PFT/maria antonia) were ordered. (1) History was not obtained from someone other than patient  (1) Old records  were reviewed - see HPI/MDM for pertinent details if review done. (2) Case was discussed with another health care provider: Dr Cruz Devries  (2) Imaging was viewed by myself. (2) EKG  was not viewed by myself. Thanks for the consult! Will follow along daily while patient is in house.       Job Starks  3/23/2021

## 2021-03-23 NOTE — PROGRESS NOTES
Patient awake, states pain is tolerable, VSS, just noticed small abrasion on right cheek. Bed available, will transfer to 4T. Pt's family updated of pt's status in PACU.

## 2021-03-23 NOTE — ANESTHESIA PRE PROCEDURE
Department of Anesthesiology  Preprocedure Note       Name:  Karen Quiroga   Age:  67 y.o.  :  1945                                          MRN:  [id number]         Date:  3/23/2021      Surgeon: Carolina Aguilar):  Nazanin Sawyer MD    Procedure: Procedure(s):  [id number] BILATERAL MICRO HEMILAMINECTOMY AND DISCECTOMY (55591, 29936)    Medications prior to admission:   Prior to Admission medications    Medication Sig Start Date End Date Taking?  Authorizing Provider   selegiline (ELDEPRYL) 5 MG capsule Take 5 mg by mouth 2 times daily (before meals)    Historical Provider, MD   amantadine (SYMMETREL) 100 MG capsule Take 100 mg by mouth 2 times daily    Historical Provider, MD   clobetasol (TEMOVATE) 0.05 % ointment  4/11/15   Historical Provider, MD   lisinopril (PRINIVIL;ZESTRIL) 20 MG tablet Take 1 tablet by mouth daily 4/11/15   Historical Provider, MD   XARELTO 20 MG TABS tablet Take 1 tablet by mouth daily 4/29/15   Historical Provider, MD   amantadine (SYMMETREL) 100 MG capsule Take 100 mg by mouth 3 times daily    Historical Provider, MD   cycloSPORINE (RESTASIS) 0.05 % ophthalmic emulsion Place 1 drop into both eyes 2 times daily    Historical Provider, MD   omeprazole (PRILOSEC) 20 MG capsule Take 20 mg by mouth daily as needed     Historical Provider, MD   tiZANidine (ZANAFLEX) 4 MG tablet Take 4 mg by mouth every 6 hours as needed    Historical Provider, MD   Coenzyme Q10 (CO Q 10) 10 MG CAPS Take by mouth    Historical Provider, MD   calcium carbonate (OSCAL) 500 MG TABS tablet Take 1,500 mg by mouth daily    Historical Provider, MD   Omega-3 Fatty Acids (FISH OIL) 1000 MG CAPS Take 2,000 mg by mouth daily    Historical Provider, MD   Multiple Vitamins-Minerals (THERAPEUTIC MULTIVITAMIN-MINERALS) tablet Take 1 tablet by mouth daily    Historical Provider, MD       Current medications:    Current Facility-Administered Medications   Medication Dose Route Frequency Provider Last Rate Last Admin    meperidine (DEMEROL) injection 12.5 mg  12.5 mg Intravenous Q5 Min PRN aVleria Dai MD        HYDROmorphone (DILAUDID) injection 0.5 mg  0.5 mg Intravenous Q5 Min PRN Valeria Dai MD        oxyCODONE-acetaminophen (PERCOCET) 5-325 MG per tablet 1 tablet  1 tablet Oral Once PRN Valeria Dai MD        on[facility]etron [facility]) injection 4 mg  4 mg Intravenous Once PRN Valeria Dai MD        labetalol (NORMODYNE;TRANDATE) injection 5 mg  5 mg Intravenous Q10 Min PRN Valeria Dai MD        hydrALAZINE (APRESOLINE) injection 5 mg  5 mg Intravenous Q10 Min PRN Valeria Dai MD           Allergies: Allergies   Allergen Reactions    Ampicillin Diarrhea       Problem List:  There is no problem list on file for this patient. Past Medical History:        Diagnosis Date    Ataxia     DVT (deep venous thrombosis) (HCC)     Hypertension     PE (pulmonary embolism)     Senile degeneration of brain, not elsewhere classified ([facility]        Past Surgical History:        Procedure Laterality Date    APPENDECTOMY      CHOLECYSTECTOMY      HYSTERECTOMY         Social History:    Social History     Tobacco Use    Smoking status: Never Smoker    Smokeless tobacco: Never Used   Substance Use Topics    Alcohol use: No                                Counseling given: Not Answered      Vital Signs (Current): There were no vitals filed for this visit.                                            BP Readings from Last 3 Encounters:   05/13/15 108/68       NPO Status:                                                                                 BMI:   Wt Readings from Last 3 Encounters:   05/13/15 205 lb (93 kg)     There is no height or weight on file to calculate BMI.    CBC:   Lab Results   Component Value Date    WBC 5.6 03/17/2021    RBC 4.09 03/17/2021    HGB 14.0 03/17/2021    HCT 42.3 03/17/2021    .4 03/17/2021    RDW 13.5 03/17/2021     03/17/2021 CMP:   Lab Results   Component Value Date     03/17/2021    K 4.5 03/17/2021     03/17/2021    CO2 20 03/17/2021    BUN 31 03/17/2021    CREATININE 1.5 03/17/2021    GFRAA 41 03/17/2021    LABGLOM 34 03/17/2021    GLUCOSE 103 03/17/2021    CALCIUM 9.3 03/17/2021       POC Tests: No results for input(s): POCGLU, POCNA, POCK, POCCL, POCBUN, POCHEMO, POCHCT in the last 72 hours. Coags:   Lab Results   Component Value Date    PROTIME 13.3 03/17/2021    INR 1.14 03/17/2021    APTT 38.3 03/17/2021       HCG (If Applicable): No results found for: PREGTESTUR, PREGSERUM, HCG, HCGQUANT     ABGs: No results found for: PHART, PO2ART, WSH9ZPZ, BAA9MIA, BEART, U0EOYCYO     Type & Screen (If Applicable):  No results found for: LABABO, LABRH    Drug/Infectious Status (If Applicable):  No results found for: HIV, HEPCAB    COVID-19 Screening (If Applicable):   Lab Results   Component Value Date    COVID19 Not Detected 03/17/2021           Anesthesia Evaluation  Patient summary reviewed and Nursing notes reviewed no history of anesthetic complications:   Airway: Mallampati: III  TM distance: <3 FB   Neck ROM: full  Mouth opening: > = 3 FB Dental: normal exam         Pulmonary:Negative Pulmonary ROS and normal exam  breath sounds clear to auscultation      (-) COPD, asthma, sleep apnea and not a current smoker                           Cardiovascular:    (+) hypertension:,     (-) past MI, CAD, CABG/stent, dysrhythmias,  angina and  CHF    ECG reviewed  Rhythm: regular  Rate: normal                    Neuro/Psych:   (+) neuromuscular disease (lumbar ddd, central atrophy w/ ataxia):,    (-) seizures, TIA and CVA           GI/Hepatic/Renal:   (+) GERD: well controlled, renal disease: CRI,      (-) liver disease       Endo/Other: Negative Endo/Other ROS       (-) diabetes mellitus, hypothyroidism, hyperthyroidism               Abdominal:           Vascular:   + DVT, PE (h/o 2015, on xarelto as outpt). Anesthesia Plan      general     ASA 3       Induction: intravenous. MIPS: Postoperative opioids intended and Prophylactic antiemetics administered. Anesthetic plan and risks discussed with patient. Plan discussed with CRNA.                   Eva Whittaker MD   3/23/2021

## 2021-03-23 NOTE — PROGRESS NOTES
Date of Surgery Update:  Ubaldo Ferrara was seen, history and physical examination reviewed, and patient examined by me today. There have been no significant clinical changes since the completion of the previous history and physical.    The risk, benefits, and alternatives of the proposed procedure have been explained to the patient (or appropriate guardian) and understanding verbalized. All questions answered. Patient wishes to proceed.     Electronically signed by: Sanjuanita Piedra MD,3/23/2021,10:31 AM

## 2021-03-23 NOTE — PROGRESS NOTES
Patient described pain as being located in lower back radiating down bilateral legs. Described as excruciating. Pain is significantly worse in left leg and foot with tingling and numbness. Tingling and numbness worse when sitting or laying. Per preop nurse, generalized weakness and needed significant assistance when transferring from wheelchair to bed.    MAHI MalaveN, RN, [employer]

## 2021-03-23 NOTE — OP NOTE
[facility] OR  3/23/2021 1:44 PM    PATIENT NAME:          Sofiya Marvin  YOB: 1945   MEDICAL RECORD#         [id number]  SURGERY DATE:         3/23/2021  SURGEON:                 Inder Heard                                                      OPERATIVE REPORT     PREOPERATIVE DIAGNOSIS:  1. Lumbar stenosis L4, L5.    POSTOPERATIVE DIAGNOSIS:  1. Lumbar stenosis L4, L5.    PROCEDURE PERFORMED:  1.  Bilateral microscopic decompressive laminectomy, L4, L5, with bilateral L5 nerve root foraminotomies. ANESTHESIA:  General oral endotracheal    ESTIMATED BLOOD LOSS:   cc.    TUBES/DRAINS:  None. INDICATIONS:   Sofiya Marvin is a 67 y.o. female with intractable lower extremity radicular pain, failing to improve with nonsurgical modalities. The risks, benefits and alternatives of a microscopic lumbar decompression were discussed with the patient in the office and she has decided to proceed with surgery. She was offered continued conservative measures versus surgical intervention and elected to proceed with surgical intervention. DETAILS OF PROCEDURE:  The patient was brought to the operating room and underwent general anesthesia. The patient was rolled into the prone position with padding over all bony protuberances. A localizing x-ray was taken. The back was scrubbed, prepped and draped in the usual sterile standard fashion. A midline linear incision was made. A Weitlaner retractor was used for exposure. A bilateral subperiosteal dissection was done to expose the spinous processes and lamina of L4 and L5. A second localizing x-ray was taken. Based on this I extended the incision slightly inferiorly and identified the last interlaminar space and the last lamina of L5. A  Shalini retractor system was set into place. A Leksell rongeur was used to remove the spinous processes of L4 and L5.  The [employer] AM-8 drill was used to first drill down the lamina on the right and than on the left at L4 and L5 and the operating microscope draped and brought into the operative field. Utilizing microdissection, I began to continue to do dissect with the [employer] Onesimo AM-8 drill, thinning down the lamina such that I could identify the posterior cortical wall breakthrough and identified the epidural fat. Using the small and medium Cloward punch, I continued to remove bone and lamina. There was significant ligamentous hypertrophy, and I was able to decompress at the L4-L5 level, identify the exiting L5 roots bilaterally and decompress them extensively doing mesial facetectomies and nerve root foraminotomies of both L5 roots. I was able to palpate on both sides of the thecal sac, identify the L4-L5 disk space. The sac was well decompressed. I irrigated out all the thrombin Gelfoam, closed the fascia and skin with #0 and 2-0 Vicryl suture and a running 3-0 nylon. A dry sterile occlusive dressing was placed over the wound. The patient tolerated the procedure well. Counts reported to the surgeon being correct. The patient was extubated in the operating room and taken to the recovery room in satisfactory condition. I went out and spoke with the patient's family. In conformance with CMS regulations, I affirm I was present in the operating room during the entire procedure.     Harsha Tan MD

## 2021-03-23 NOTE — PROGRESS NOTES
Incentive Spirometry education and demonstration completed by Respiratory Therapy Yes      Response to education: Very Good     Teaching Time: 5 minutes    Minimum Predicted Vital Capacity - 570 mL. Patient's Actual Vital Capacity - 1000 mL. Turning over to Nursing for routine follow-up Yes.     Comments: continue spirometer q2h w/a    Electronically signed by Jenna Rodríguez RCP on 3/23/2021 at 7:37 PM

## 2021-03-24 VITALS
OXYGEN SATURATION: 98 % | BODY MASS INDEX: 29.82 KG/M2 | DIASTOLIC BLOOD PRESSURE: 65 MMHG | WEIGHT: 179 LBS | SYSTOLIC BLOOD PRESSURE: 119 MMHG | HEART RATE: 61 BPM | HEIGHT: 65 IN | RESPIRATION RATE: 16 BRPM | TEMPERATURE: 98.2 F

## 2021-03-24 PROBLEM — D62 ACUTE BLOOD LOSS AS CAUSE OF POSTOPERATIVE ANEMIA: Status: ACTIVE | Noted: 2021-03-24

## 2021-03-24 LAB
HCT VFR BLD CALC: 37.7 % (ref 36–48)
HEMOGLOBIN: 12.5 G/DL (ref 12–16)

## 2021-03-24 PROCEDURE — 2500000003 HC RX 250 WO HCPCS: Performed by: NEUROLOGICAL SURGERY

## 2021-03-24 PROCEDURE — 97116 GAIT TRAINING THERAPY: CPT

## 2021-03-24 PROCEDURE — 6370000000 HC RX 637 (ALT 250 FOR IP): Performed by: INTERNAL MEDICINE

## 2021-03-24 PROCEDURE — 6370000000 HC RX 637 (ALT 250 FOR IP): Performed by: NEUROLOGICAL SURGERY

## 2021-03-24 PROCEDURE — 85018 HEMOGLOBIN: CPT

## 2021-03-24 PROCEDURE — G0378 HOSPITAL OBSERVATION PER HR: HCPCS

## 2021-03-24 PROCEDURE — 85014 HEMATOCRIT: CPT

## 2021-03-24 PROCEDURE — 6360000002 HC RX W HCPCS: Performed by: NEUROLOGICAL SURGERY

## 2021-03-24 PROCEDURE — 97166 OT EVAL MOD COMPLEX 45 MIN: CPT

## 2021-03-24 PROCEDURE — 97162 PT EVAL MOD COMPLEX 30 MIN: CPT

## 2021-03-24 PROCEDURE — 2580000003 HC RX 258: Performed by: NEUROLOGICAL SURGERY

## 2021-03-24 PROCEDURE — 96374 THER/PROPH/DIAG INJ IV PUSH: CPT

## 2021-03-24 RX ORDER — PANTOPRAZOLE SODIUM 40 MG/1
40 TABLET, DELAYED RELEASE ORAL
Status: DISCONTINUED | OUTPATIENT
Start: 2021-03-24 | End: 2021-03-24 | Stop reason: HOSPADM

## 2021-03-24 RX ORDER — POLYETHYLENE GLYCOL 3350 17 G/17G
17 POWDER, FOR SOLUTION ORAL DAILY
Qty: 527 G | Refills: 0 | COMMUNITY
Start: 2021-03-25 | End: 2021-04-24

## 2021-03-24 RX ADMIN — Medication 10 ML: at 08:23

## 2021-03-24 RX ADMIN — OXYCODONE 10 MG: 5 TABLET ORAL at 08:21

## 2021-03-24 RX ADMIN — CALCIUM 1500 MG: 500 TABLET ORAL at 08:21

## 2021-03-24 RX ADMIN — OXYCODONE 10 MG: 5 TABLET ORAL at 14:24

## 2021-03-24 RX ADMIN — POLYETHYLENE GLYCOL 3350 17 G: 17 POWDER, FOR SOLUTION ORAL at 08:22

## 2021-03-24 RX ADMIN — HYDROMORPHONE HYDROCHLORIDE 0.5 MG: 1 INJECTION, SOLUTION INTRAMUSCULAR; INTRAVENOUS; SUBCUTANEOUS at 12:02

## 2021-03-24 RX ADMIN — LISINOPRIL 20 MG: 20 TABLET ORAL at 08:21

## 2021-03-24 RX ADMIN — CEFAZOLIN SODIUM 2000 MG: 10 INJECTION, POWDER, FOR SOLUTION INTRAVENOUS at 04:15

## 2021-03-24 RX ADMIN — CARVEDILOL 3.12 MG: 3.12 TABLET, FILM COATED ORAL at 08:21

## 2021-03-24 RX ADMIN — PANTOPRAZOLE SODIUM 40 MG: 40 TABLET, DELAYED RELEASE ORAL at 06:21

## 2021-03-24 RX ADMIN — POTASSIUM CHLORIDE, DEXTROSE MONOHYDRATE AND SODIUM CHLORIDE 1000 ML: 150; 5; 450 INJECTION, SOLUTION INTRAVENOUS at 04:15

## 2021-03-24 ASSESSMENT — PAIN SCALES - GENERAL: PAINLEVEL_OUTOF10: 10

## 2021-03-24 NOTE — PLAN OF CARE
Problem: Falls - Risk of:  Goal: Will remain free from falls  Description: Will remain free from falls  3/23/2021 2349 by Juan C Woods RN  Outcome: Ongoing  3/23/2021 1650 by Aleksandra Jones RN  Outcome: Ongoing  Goal: Absence of physical injury  Description: Absence of physical injury  Outcome: Ongoing     Problem: Pain:  Goal: Pain level will decrease  Description: Pain level will decrease  Outcome: Ongoing  Goal: Control of acute pain  Description: Control of acute pain  Outcome: Ongoing  Goal: Control of chronic pain  Description: Control of chronic pain  Outcome: Ongoing     Problem: Discharge Planning:  Goal: Discharged to appropriate level of care  Description: Discharged to appropriate level of care  Outcome: Ongoing     Problem: Cerebrospinal Fluid Leakage - Risk Of:  Goal: Absence of restraint indications  Description: Absence of cerebrospinal fluid drainage at surgical site  Outcome: Ongoing  Goal: Absence of postural headache  Description: Absence of postural headache  Outcome: Ongoing     Problem: Infection - Surgical Site:  Goal: Will show no infection signs and symptoms  Description: Will show no infection signs and symptoms  Outcome: Ongoing     Problem: Mobility - Impaired:  Goal: Mobility will improve to maximum level  Description: Mobility will improve to maximum level  Outcome: Ongoing  Goal: Able to ambulate independently  Description: Able to ambulate independently  Outcome: Ongoing  Goal: Compliance with physical therapy regimen will improve  Description: Compliance with physical therapy regimen will improve  Outcome: Ongoing  Goal: Able to perform range-of-motion exercises independently  Description: Able to perform range-of-motion exercises independently  Outcome: Ongoing     Problem: Pain:  Goal: Pain level will decrease  Description: Pain level will decrease  Outcome: Ongoing  Goal: Control of acute pain  Description: Control of acute pain  Outcome: Ongoing  Goal: Control of chronic pain  Description: Control of chronic pain  Outcome: Ongoing     Problem: Sensory Perception - Impaired:  Goal: Absence of restraint-related injury  Description: Sensory function intact, lower extremity  Outcome: Ongoing  Goal: Ability to demonstrate correct body alignment while lying, sitting, and standing will improve  Description: Ability to demonstrate correct body alignment while lying, sitting, and standing will improve  Outcome: Ongoing  Goal: Circulatory function of lower extremities is within specified parameters  Description: Circulatory function of lower extremities is within specified parameters  Outcome: Ongoing     Problem: Urinary Retention:  Goal: Urinary elimination within specified parameters  Description: Urinary elimination within specified parameters  Outcome: Ongoing  Goal: Ability to reestablish a normal urinary elimination pattern will improve - after catheter removal  Description: Ability to reestablish a normal urinary elimination pattern will improve - after catheter removal  Outcome: Ongoing  Goal: Absence of postvoid residual urine  Description: Absence of postvoid residual urine  Outcome: Ongoing     Problem: Venous Thromboembolism:  Goal: Will show no signs or symptoms of venous thromboembolism  Description: Will show no signs or symptoms of venous thromboembolism  Outcome: Ongoing  Goal: Absence of signs or symptoms of impaired coagulation  Description: Absence of signs or symptoms of impaired coagulation  Outcome: Ongoing     Problem: Cardiac:  Goal: Hemodynamic stability will improve  Description: Hemodynamic stability will improve  Outcome: Ongoing  Goal: Complications related to the disease process, condition or treatment will be avoided or minimized  Description: Complications related to the disease process, condition or treatment will be avoided or minimized  Outcome: Ongoing  Goal: Cerebral tissue perfusion will improve  Description: Cerebral tissue perfusion will improve Outcome: Ongoing

## 2021-03-24 NOTE — PROGRESS NOTES
Physical Therapy    Facility/Department: [address] ORTHO/NEURO NURSING  Initial Assessment    NAME: Ubaldo Ferrara  : 1945  MRN: [id number]    Date of Service: 3/24/2021    Discharge Recommendations: Ubaldo Ferrara scored a 19/24 on the AM-PAC short mobility form. Current research shows that an AM-PAC score of 18 or greater is typically associated with a discharge to the patient's home setting. Based on the patient's AM-PAC score and their current functional mobility deficits, it is recommended that the patient have 2-3 sessions per week of Physical Therapy at d/c to increase the patient's independence. At this time, this patient demonstrates the endurance and safety to discharge home with HHPT and a follow up treatment frequency of 2-3x/wk. Please see assessment section for further patient specific details. HOME HEALTH CARE: LEVEL 1 STANDARD  - Initial home health evaluation to occur within 24-48 hours, in patient home   - Therapy to evaluate with goal of regaining prior level of functioning   - Therapy to evaluate if patient has [id number] [facility] needs for personal care    If patient discharges prior to next session this note will serve as a discharge summary. Please see below for the latest assessment towards goals. PT Equipment Recommendations  Equipment Needed: No    Assessment   Body structures, Functions, Activity limitations: Decreased functional mobility ; Decreased endurance;Decreased balance; Increased pain;Decreased posture  Assessment: Pt presents with decreased functional mobility and increased pain s/p lumbar surgery. Pt was able to complete bed mobility while maintaining spinal precautions with SBA. Pt was able to complete sit<>stand transfers and ambulate 120 ft with rollator with SBA-CGA, however pt c/o of increased pain in hips.  Attempted to end treatment sitting up in recliner but pt refused due to reproduction of hip pain when seated upright or in a recliner and reports she spends most of her time lying down on sofa when at home. Pt would continue to benefit from skilled therapy in order to improve functional mobility, decrease pain, and safely return to PLOF. Treatment Diagnosis: decreased functional mobility, increased pain  Prognosis: Good  Decision Making: Medium Complexity  History: HTN, ataxia, DVT, GERD, PE, senile degeneration, parkinsonisms  Clinical Presentation: evolving  PT Education: PT Role;Plan of Care;Precautions;Orientation;General Safety;Gait Training;Functional Mobility Training  Patient Education: discussed discharge recommendations and reminded of spinal precautions. Pt verbalized understanding  Barriers to Learning: none  REQUIRES PT FOLLOW UP: Yes  Activity Tolerance  Activity Tolerance: Patient Tolerated treatment well;Patient limited by pain  Activity Tolerance: Attempted to end treatment sitting up in recliner but pt refused due to increased pain in her hips when sitting upright or in recliner       Patient Diagnosis(es): The encounter diagnosis was Spinal stenosis of lumbar region with neurogenic claudication. has a past medical history of Ataxia, DVT (deep venous thrombosis) ([address] GERD (gastroesophageal reflux disease), Hypertension, PE (pulmonary embolism), and Senile degeneration of brain, not elsewhere classified ([address] has a past surgical history that includes Cholecystectomy; Appendectomy; Hysterectomy; and Lumbar spine surgery (Bilateral, 3/23/2021). Restrictions  Restrictions/Precautions  Restrictions/Precautions: Fall Risk(high risk, spinal precautions)  Required Braces or Orthoses?: No  Position Activity Restriction  Spinal Precautions: No Bending, No Lifting, No Twisting  Other position/activity restrictions: s/p L4-L5 decompressive laminectomy 3/23     Vision/Hearing  Vision: Impaired  Vision Exceptions: Wears glasses for reading;Wears glasses for distance  Hearing: Within functional limits       Subjective  General  Chart Reviewed:  Yes Patient assessed for rehabilitation services?: Yes  Additional Pertinent Hx: HTN, ataxia, DVT, GERD, PE, senile degeneration, parkinsonisms  Response To Previous Treatment: Not applicable  Family / Caregiver Present: No  Diagnosis: Spinal stenosis of lumbar region with neurogenic claudication  Follows Commands: Within Functional Limits  General Comment  Comments: Pt supine in bed upon arrival. Pt agreeable to therapy  Subjective  Subjective: Pt reports 8/10 pain in B hips.  Nurse notified  Pain Screening  Patient Currently in Pain: Yes  Vital Signs  Patient Currently in Pain: Yes       Orientation  Orientation  Overall Orientation Status: Within Functional Limits     Social/Functional History  Social/Functional History  Lives With: Spouse  Home Layout: Two level, Able to Live on Main level with bedroom/bathroom(bed and bath on 1st level)  Home Access: Level entry  Bathroom Shower/Tub: Tub/Shower unit(used tub shower until fall, plan to sponge bathe until shower installed)  Bathroom Toilet: Handicap height  Bathroom Equipment: Grab bars in shower, Grab bars around toilet  Home Equipment: 4 wheeled walker  ADL Assistance: [address] [place] [address] Avenue: Independent(shares with spouse)  Homemaking Responsibilities: Yes  Ambulation Assistance: Independent(with 4 wheeled walker)  Transfer Assistance: Independent  Active : Yes(spouse drives most of the time)  Additional Comments: 2 falls within last 6 months, one in bathroom other with ambulations with turning     Cognition   Cognition  Overall Cognitive Status: WFL    Objective     Observation/Palpation  Posture: Fair(thoracic kyphosis)    AROM RLE (degrees)  RLE AROM: WFL  AROM LLE (degrees)  LLE AROM : WFL  Strength RLE  Strength RLE: WFL  Comment: gross MMT 3+/5  Strength LLE  Strength LLE: WFL  Comment: gross MMT 3+/5  Tone RLE  RLE Tone: Normotonic  Tone LLE  LLE Tone: Normotonic  Sensation  Overall Sensation Status: WFL  Bed mobility  Rolling to Right: Stand by assistance  Supine to Sit: Stand by assistance  Sit to Supine: Stand by assistance  Scooting: Stand by assistance  Comment: Pt reminded of spinal precautions and to perform log roll for bed mobility  Transfers  Sit to Stand: Contact guard assistance  Stand to sit: Contact guard assistance  Ambulation  Ambulation?: Yes  Ambulation 1  Surface: level tile  Device: Rollator  Assistance: Stand by assistance;Contact guard assistance(Between CGA-SBA)  Quality of Gait: Pt ambulates with forward flexed posture, decreased step length, and decreased velocity  Gait Deviations: Slow Nicole;Decreased step length;Decreased step height  Distance: Pt was able to ambulate 120 ft with 4 wheeled walker with CGA-SBA before requiring seated rest break  Comments: Pt c/o left hip pain during ambulation  Stairs/Curb  Stairs?: No     Balance  Posture: Fair(thoracic kyphosis)  Sitting - Static: Good  Sitting - Dynamic: Good  Standing - Static: Fair;+(with 4 wheeled walker)  Standing - Dynamic: Fair;+(with 4 wheeled walker)        Plan   Plan  Times per week: 7x  Times per day: Daily  Current Treatment Recommendations: Strengthening, Balance Training, Functional Mobility Training, Gait Training, Endurance Training, Pain Management, Modalities, Equipment Evaluation, Education, & procurement, Patient/Caregiver Education & Training, Safety Education & Training  Safety Devices  Type of devices:  All fall risk precautions in place, Call light within reach, Bed alarm in place, Gait belt, Patient at risk for falls, Left in bed, Nurse notified  Restraints  Initially in place: No    G-Code       OutComes Score                   AM-PAC Score  AM-PAC Inpatient Mobility Raw Score : 19 (03/24/21 0958)  AM-PAC Inpatient T-Scale Score : 45.44 (03/24/21 0958)  Mobility Inpatient CMS 0-100% Score: 41.77 (03/24/21 0958)  Mobility Inpatient CMS G-Code Modifier : CK (03/24/21 0958)          Goals  Short term goals  Time Frame for Short term goals: upon discharge  Short term goal 1: Pt will be able to complete bed mobility with supervision  Short term goal 2: Pt will be able to complete sit<>stand transfers with supervision  Short term goal 3: Pt will be able to ambulate 150 ft with SBA and LRAD       Therapy Time   Individual Concurrent Group Co-treatment   Time In 0850         Time Out 0929         Minutes 39              Timed Code Treatment Minutes:    8    Total Treatment Minutes:  39  Time spent with pt shared with OT as pt is under observation status. As such, pt is being billed 2 units for evaluation. Cindy Linda, SPT     PT providing direct supervision during session and assisting in making skilled judgements throughout session.   [id number] [facility] PT, DPT [id number]     [id number] [facility], PT

## 2021-03-24 NOTE — PROGRESS NOTES
-2040: PM assessment complete, VSS, neuro check unchanged, dressing CDI, LUIS intact, pain controlled. -2230: Ambulated to [facility], walker x2, tolerated well  The care plan and education has been reviewed and mutually agreed upon with the patient, call light within reach, bed alarm on for safety.

## 2021-03-24 NOTE — PROGRESS NOTES
Progress Note - Dr. Izzy Ceja - Internal Medicine  PCP: No primary care provider on file. No primary physician on file. None    Hospital Day: 0  Code Status: Full Code  Current Diet: DIET GENERAL;        CC: follow up on medical issues    Subjective:   Sofiya Marvin is a 67 y.o. female. She denies problems    Doing ok post op  Pain controlled  No issues noted  Has not yet worked with therapy      She denies chest pain, denies shortness of breath, denies nausea,  denies emesis. 10 system Review of Systems is reviewed with patient, and pertinent positives are noted in HPI above . Otherwise, Review of systems is negative. I have reviewed the patient's medical and social history in detail and updated the computerized patient record. To recap: She  has a past medical history of Ataxia, DVT (deep venous thrombosis) ([facility] [address] GERD (gastroesophageal reflux disease), Hypertension, PE (pulmonary embolism), and Senile degeneration of brain, not elsewhere classified ([facility] [address] . She  has a past surgical history that includes Cholecystectomy; Appendectomy; Hysterectomy; and Lumbar spine surgery (Bilateral, 3/23/2021). . She  reports that she has never smoked. She has never used smokeless tobacco. She reports that she does not drink alcohol or use drugs. .        Active Hospital Problems    Diagnosis Date Noted    Acute blood loss as cause of postoperative anemia [D62] 03/24/2021    Spinal stenosis of lumbar region with neurogenic claudication [M48.062] 03/23/2021    HTN (hypertension) [I10] 03/23/2021    GERD (gastroesophageal reflux disease) [K21.9] 03/23/2021    Parkinsonism ([facility] [address] [place] 03/23/2021       Current Facility-Administered Medications: pantoprazole (PROTONIX) tablet 40 mg, 40 mg, Oral, QAM AC  tiZANidine (ZANAFLEX) tablet 4 mg, 4 mg, Oral, Q6H PRN  selegiline (ELDEPRYL) capsule 5 mg - PATIENT SUPPLIED, 5 mg, Oral, BID AC  lisinopril (PRINIVIL;ZESTRIL) tablet 20 mg, 20 mg, Oral, Daily  carvedilol (COREG) tablet 3.125 mg, 3.125 mg, Oral, BID WC  calcium elemental (OSCAL) tablet 1,500 mg, 1,500 mg, Oral, Daily  dextrose 5 % and 0.45 % NaCl with KCl 20 mEq infusion, 1,000 mL, Intravenous, Continuous  sodium chloride flush 0.9 % injection 10 mL, 10 mL, Intravenous, 2 times per day  sodium chloride flush 0.9 % injection 10 mL, 10 mL, Intravenous, PRN  promethazine (PHENERGAN) tablet 12.5 mg, 12.5 mg, Oral, Q6H PRN **OR** ondansetron (ZOFRAN) injection 4 mg, 4 mg, Intravenous, Q6H PRN  oxyCODONE (ROXICODONE) immediate release tablet 5 mg, 5 mg, Oral, Q4H PRN **OR** oxyCODONE (ROXICODONE) immediate release tablet 10 mg, 10 mg, Oral, Q4H PRN  HYDROmorphone HCl PF (DILAUDID) injection 0.5 mg, 0.5 mg, Intravenous, Q3H PRN  polyethylene glycol (GLYCOLAX) packet 17 g, 17 g, Oral, Daily  bisacodyl (DULCOLAX) EC tablet 5 mg, 5 mg, Oral, Daily PRN         Objective:  BP (!) 146/80   Pulse 62   Temp 98.2 °F (36.8 °C) (Oral)   Resp 16   Ht 5' 5\" (1.651 m)   Wt 179 lb (81.2 kg)   SpO2 97%   BMI 29.79 kg/m²      Patient Vitals for the past 24 hrs:   BP Temp Temp src Pulse Resp SpO2 Height Weight   03/24/21 0815 (!) 146/80 98.2 °F (36.8 °C) Oral 62 16 97 %     03/24/21 0411 (!) 125/56 98.1 °F (36.7 °C) Oral 62 14 98 %     03/24/21 0021 119/72 97.6 °F (36.4 °C) Oral 64 16 97 %     03/23/21 2030 127/64 97.8 °F (36.6 °C) Oral 68 16 95 %     03/23/21 1917      97 %     03/23/21 1741      97 %     03/23/21 1642 (!) 145/89 98.5 °F (36.9 °C) Oral 85 17 95 %     03/23/21 1630       5' 5\" (1.651 m) 179 lb (81.2 kg)   03/23/21 1615 (!) 160/102 98.3 °F (36.8 °C) Oral 78 16      03/23/21 1445 (!) 145/48   69 15 95 %     03/23/21 1430 (!) 159/55   66 18 98 %     03/23/21 1427 (!) 176/53  Mat Nasuti      03/23/21 1415 (!) 162/78   65 14 99 %     03/23/21 1406 (!) 196/91          03/23/21 1405 (!) 187/83 97.6 °F (36.4 °C) Temporal 65 17 100 %     03/23/21 1400 (!) 196/61   66 17 100 %     03/23/21 1355 (!) 210/77   63 9 100 %     03/23/21 1353 (!) 201/74 97.4 °F (36.3 °C) Temporal 63 13 100 %     03/23/21 1026 (!) 152/79 96.8 °F (36 °C)  58 16 98 %       Patient Vitals for the past 96 hrs (Last 3 readings):   Weight   03/23/21 1630 179 lb (81.2 kg)           Intake/Output Summary (Last 24 hours) at 3/24/2021 0859  Last data filed at 3/24/2021 9143  Gross per 24 hour   Intake 2080 ml   Output 1470 ml   Net 610 ml         Physical Exam:   BP (!) 146/80   Pulse 62   Temp 98.2 °F (36.8 °C) (Oral)   Resp 16   Ht 5' 5\" (1.651 m)   Wt 179 lb (81.2 kg)   SpO2 97%   BMI 29.79 kg/m²   General appearance: alert, appears stated age and cooperative  Head: Normocephalic, without obvious abnormality, atraumatic  Lungs: clear to auscultation bilaterally  Heart: regular rate and rhythm, S1, S2 normal, no murmur, click, rub or gallop  Abdomen: soft, non-tender; bowel sounds normal; no masses,  no organomegaly  Extremities: extremities normal, atraumatic, no cyanosis or edema    Labs:  Lab Results   Component Value Date    WBC 5.6 03/17/2021    HGB 12.5 03/24/2021    HCT 37.7 03/24/2021     (L) 03/17/2021     03/17/2021    K 4.5 03/17/2021     03/17/2021    CREATININE 1.5 (H) 03/17/2021    BUN 31 (H) 03/17/2021    CO2 20 (L) 03/17/2021    INR 1.14 03/17/2021     No results found for: CKTOTAL, CKMB, CKMBINDEX, TROPONINI    Recent Imaging Results are Reviewed:  Xr Lumbar Spine 1 Vw    Result Date: 3/23/2021  Radiology exam is complete. No Radiologist dictation. Please follow up with ordering provider. Fluoro For Surgical Procedures    Result Date: 3/23/2021  Radiology exam is complete. No Radiologist dictation. Please follow up with ordering provider. Assessment and Plan:  Principal Problem:    Spinal stenosis of lumbar region with neurogenic claudication -Established problem. Improving. Doing well  Plan: stay on post op pathway. To work with pt/ot today. Transition to oral pain meds. Cont to follow h/h to assess post op anemia. Active Problems:    HTN (hypertension) -Established problem. Stable. 146/80  Plan: Pt home BP meds reviewed and will be continued. IV Hydralazine ordered for control of extremely high blood pressures   Will monitor labs to assess Creat/K for possible complications of medications. GERD (gastroesophageal reflux disease) -Established problem. Stable. Plan: Continue present orders/plan. Parkinsonism ([address] -Established problem. Stable. Plan: Continue present orders/plan. Acute blood loss as cause of postoperative anemia -New Problem to me. Drop of 1.5gm since preop labs  Plan: No indication for transfusion. Cont to monitor h/h to assess progression of anemia. Recommend ferrous sulfate or MVI as outpatient.              Jessica Aleman  3/24/2021

## 2021-03-24 NOTE — PROGRESS NOTES
Occupational Therapy   Occupational Therapy Initial Assessment  Date: 3/24/2021   Patient Name: Ubaldo Ferrara  MRN: [id number]     : 1945    Date of Service: 3/24/2021    Discharge Recommendations: Ubaldo Ferrara scored a 19/24 on the AM-PAC ADL Inpatient form. Current research shows that an AM-PAC score of 18 or greater is typically associated with a discharge to the patient's home setting. Based on the patient's AM-PAC score, and their current ADL deficits, it is recommended that the patient have 2-3 sessions per week of Occupational Therapy at d/c to increase the patient's independence. At this time, this patient demonstrates the endurance and safety to discharge home with home services (home vs OP services) and a follow up treatment frequency of 2-3x/wk. Please see assessment section for further patient specific details. If patient discharges prior to next session this note will serve as a discharge summary. Please see below for the latest assessment towards goals. HOME HEALTH CARE: LEVEL 1 STANDARD    - Initial home health evaluation to occur within 24-48 hours, in patient home   - Therapy to evaluate with goal of regaining prior level of functioning   - Therapy to evaluate if patient has [address] needs for personal care       OT Equipment Recommendations  Equipment Needed: No  Other: continue to assess - potentially shower chair, LH sponge    Assessment   Performance deficits / Impairments: Decreased functional mobility ; Decreased endurance;Decreased ADL status; Decreased balance;Decreased strength;Decreased high-level IADLs  Assessment: Patient presents below baseline d/t above deficits; OT indicated to maximize safety/independence with ADL and IADL  Treatment Diagnosis: Above deficits associated with spinal stenosis  Prognosis: Good  Decision Making: Medium Complexity  Exam: as above  OT Education: OT Role;Plan of Care  Patient Education: eval, discharge - patient v/u, would benefit Position: Semi fowlers  Level of Consciousness: Alert (0)  MEWS Score: 1  Patient Currently in Pain: Yes  Oxygen Therapy  SpO2: 97 %  Pulse Oximeter Device Mode: Intermittent  Pulse Oximeter Device Location: Finger  O2 Device: None (Room air)  Social/Functional History  Social/Functional History  Lives With: Spouse  Home Layout: Two level, Able to Live on Main level with bedroom/bathroom(bed and bath on 1st level)  Home Access: Level entry  Bathroom Shower/Tub: Tub/Shower unit(used tub shower until fall, plan to sponge bathe until shower installed)  Bathroom Toilet: Handicap height  Bathroom Equipment: Grab bars in shower, Grab bars around toilet  Home Equipment: 4 wheeled walker  ADL Assistance: [address] [place] [address] Avenue: Independent(shares with spouse)  Homemaking Responsibilities: Yes  Ambulation Assistance: Independent(with 4 wheeled walker)  Transfer Assistance: Independent  Active : Yes(spouse drives most of the time)  Additional Comments: 2 falls within last 6 months, one in bathroom other with ambulations with turning       Objective   Vision: Impaired  Vision Exceptions: Wears glasses for reading;Wears glasses for distance  Hearing: Within functional limits    Orientation  Overall Orientation Status: Within Functional Limits  Observation/Palpation  Posture: Fair(thoracic kyphosis)  Balance  Sitting Balance: Stand by assistance  Standing Balance: Contact guard assistance  Functional Mobility  Functional - Mobility Device: 4-Wheeled Walker  Activity: Other  Assist Level: Contact guard assistance  Functional Mobility Comments: 120' total in hallway with 1SD  ADL  Additional Comments: Declines to complete  Tone RUE  RUE Tone: Normotonic  Tone LUE  LUE Tone: Normotonic  Coordination  Movements Are Fluid And Coordinated: Yes     Bed mobility  Rolling to Right: Stand by assistance  Supine to Sit: Stand by assistance  Sit to Supine: Stand by assistance  Scooting: Stand by assistance  Comment: Pt reminded of spinal precautions and to perform log roll for bed mobility  Transfers  Sit to stand: Contact guard assistance(x2, from EOB, recliner)  Stand to sit: Contact guard assistance(x2, to recliner, EOB)     Cognition  Overall Cognitive Status: WFL        Sensation  Overall Sensation Status: WFL        LUE AROM (degrees)  LUE AROM : WFL  RUE AROM (degrees)  RUE AROM : WFL                      Plan   Plan  Times per week: 7  Times per day: Daily  Current Treatment Recommendations: Strengthening, Patient/Caregiver Education & Training, Equipment Evaluation, Education, & procurement, Balance Training, Functional Mobility Training, Endurance Training, Self-Care / ADL, Safety Education & Training    G-Code     OutComes Score                                                  AM-PAC Score        AM-PAC Inpatient Daily Activity Raw Score: 19 (03/24/21 1009)  AM-PAC Inpatient ADL T-Scale Score : 40.22 (03/24/21 1009)  ADL Inpatient CMS 0-100% Score: 42.8 (03/24/21 1009)  ADL Inpatient CMS G-Code Modifier : CK (03/24/21 1009)    Goals  Short term goals  Time Frame for Short term goals: discharge  Short term goal 1: UB ADL mod I  Short term goal 2: LB ADL mod I  Short term goal 3: Fxl transfers mod I  Short term goal 4: Fxl mob mod I  Short term goal 5:  Toileting mod I  Long term goals  Time Frame for Long term goals : LTG=STG       Therapy Time   Individual Concurrent Group Co-treatment   Time In 0850         Time Out 0929         Minutes 39            Timed Code Treatment Minutes:    0 minutes    Total Treatment Minutes:  39 minutes    Billing split with PT secondary to patient's observation status    CATALINA Martinez OTR/L [id number]    Jenni Gonzalez OT

## 2021-03-24 NOTE — CARE COORDINATION
Discharge Planning Assessment  Rn/SW discharge planner met w/patient/family member to discuss reason for admission, current living situation, and potential needs at the time of discharge. Demographics/Insurance verified Yes    Current type of dwellin level home-able to live on main, level entry    Living arrangements: w/spouse    Level of function/support: independent w/all ADL's-spouse stated that pt and spouse assist each other (pt's spouse has Parkinson's)    PCP:     Last Visit to PCP: w/in the month    DME: pt stated she has a 4 wheeled walker, grab bars in shower and around toilet    Active with any community resources/agencies/skilled home care: stated no services in the home    Medication compliance issues: stated no issues    Financial issues that could impact healthcare: stated no issues    Transportation at time of d/c (Discussed w/pt/family that on the day of discharge home, tentative time of discharge will be between 10am and noon): family    Discussed and provided facilities of choice if transition to a skilled nursing facility is required a the time of discharge-NA. Tentative discharge plan: Met w/pt per consult order to discuss any potential needs on dc. Pt stated she has a 4 wheeled walker at home-no equipment recommended from therapy teams. Pt will dc to home w/no needs at this time. Sw cont to follow.     Electronically signed by BELKIS Martinez  [phone]

## 2021-03-24 NOTE — DISCHARGE SUMMARY
Discharge Summary    Date of Admission: 3/23/2021 10:04 AM  Date of Discharge: 3/24/2021  Admission Diagnosis:   Patient Active Problem List   Diagnosis    Spinal stenosis of lumbar region with neurogenic claudication    HTN (hypertension)    GERD (gastroesophageal reflux disease)    Parkinsonism (HCC)    Acute blood loss as cause of postoperative anemia     Discharge Diagnosis: Same   Condition on Discharge: good  Attending for Admission: Dr. Cali Nguyen  Procedures: Bilateral microscopic decompressive laminectomy, L4, L5, with bilateral L5 nerve root foraminotomies.     Hospital Course: Fercho Alexis is a 67 y.o. female patient with intractable lower extremity radicular pain. Imaging showed spinal stenosis. Failing to improve with nonsurgical modalities, She underwent the procedure listed above on date of admission. After surgery, Her pre-operative radicular pain was improved. She c/o left hip pain. She complained of incisional pain. The pain was well-controlled on oral medications. The dressing was clean, dry and intact. There was no erythema or edema around the surgical site. Prior to discharge She was eating well, urinating and ambulating with a steady gait with PT/OT.      Discharge Vitals/Labs: /65   Pulse 61   Temp 98.2 °F (36.8 °C) (Oral)   Resp 16   Ht 5' 5\" (1.651 m)   Wt 179 lb (81.2 kg)   SpO2 98%   BMI 29.79 kg/m²   CBC with Differential:    Lab Results   Component Value Date    WBC 5.6 03/17/2021    RBC 4.09 03/17/2021    HGB 12.5 03/24/2021    HCT 37.7 03/24/2021     03/17/2021    .4 03/17/2021    MCH 34.1 03/17/2021    MCHC 33.0 03/17/2021    RDW 13.5 03/17/2021     CMP:    Lab Results   Component Value Date     03/17/2021    K 4.5 03/17/2021     03/17/2021    CO2 20 03/17/2021    BUN 31 03/17/2021    CREATININE 1.5 03/17/2021    GFRAA 41 03/17/2021    LABGLOM 34 03/17/2021    GLUCOSE 103 03/17/2021    CALCIUM 9.3 03/17/2021      Discharge Medications: Medication List      START taking these medications    oxyCODONE 5 MG immediate release tablet  Commonly known as: Roxicodone  Take 1 tablet by mouth every 6 hours as needed for Pain for up to 7 days. Intended supply: 7 days. Take lowest dose possible to manage pain     polyethylene glycol 17 g packet  Commonly known as: GLYCOLAX  Take 17 g by mouth daily  Start taking on: March 25, 2021        CHANGE how you take these medications    rivaroxaban 20 MG Tabs tablet  Commonly known as: Xarelto  Take 1 tablet by mouth daily  Start taking on: March 30, 2021  What changed:   · how much to take  · These instructions start on March 30, 2021. If you are unsure what to do until then, ask your doctor or other care provider. CONTINUE taking these medications    * amantadine 100 MG capsule  Commonly known as: SYMMETREL     * amantadine 100 MG capsule  Commonly known as: SYMMETREL     calcium carbonate 500 MG Tabs tablet  Commonly known as: OSCAL     carvedilol 3.125 MG tablet  Commonly known as: COREG     clobetasol 0.05 % ointment  Commonly known as: TEMOVATE     Co Q 10 10 MG Caps     fish oil 1000 MG Caps     lisinopril 20 MG tablet  Commonly known as: PRINIVIL;ZESTRIL     omeprazole 20 MG delayed release capsule  Commonly known as: PRILOSEC     Restasis 0.05 % ophthalmic emulsion  Generic drug: cycloSPORINE     selegiline 5 MG capsule  Commonly known as: ELDEPRYL     therapeutic multivitamin-minerals tablet     tiZANidine 4 MG tablet  Commonly known as: [place]         * This list has 2 medication(s) that are the same as other medications prescribed for you. Read the directions carefully, and ask your doctor or other care provider to review them with you.                Where to Get Your Medications      You can get these medications from any pharmacy    Bring a paper prescription for each of these medications  · oxyCODONE 5 MG immediate release tablet  You don't need a prescription for these medications  · polyethylene glycol 17 g packet     Information about where to get these medications is not yet available    Ask your nurse or doctor about these medications  · rivaroxaban 20 MG Tabs tablet       Discharge Destination: The patient was discharged to Home. Follow-up: The patient is to follow-up with our office in the office in 2-3 weeks. Discharge Instructions: Verbal and written discharge instructions as well as dressing change instructions were given to the patient at the time of consent. No anticoagulation for 1 week post-operatively. No driving. No lifting or bending.       Julia Wells  3/24/2021

## (undated) DEVICE — 3.0MM NEURO (MATCH HEAD)

## (undated) DEVICE — SURE SET SINGLE BASIN-LF: Brand: MEDLINE INDUSTRIES, INC.

## (undated) DEVICE — TRAY PREP DRY W/ PREM GLV 2 APPL 6 SPNG 2 UNDPD 1 OVERWRAP

## (undated) DEVICE — RONGEUR SURG KERRISON 2 MM SHRP THN FTPLT DISP

## (undated) DEVICE — SYMMETRY SHARP KERRISON® RONGEUR TIPS, THIN FOOTPLATE, 3 MM TIP, SINGLE USE, (3/BX): Brand: SYMMETRY SHARP KERRISON

## (undated) DEVICE — HYPODERMIC SAFETY NEEDLE: Brand: MAGELLAN

## (undated) DEVICE — SPONGES GAUZE X-RAY 4X4 16PLY

## (undated) DEVICE — DRAPE,LAP,CHOLE,W/TROUGHS,STERILE: Brand: MEDLINE

## (undated) DEVICE — SOLUTION IV IRRIG POUR BRL 0.9% SODIUM CHL 2F7124

## (undated) DEVICE — 3M™ TEGADERM™ TRANSPARENT FILM DRESSING FRAME STYLE, 1628, 6 IN X 8 IN (15 CM X 20 CM), 10/CT 8CT/CASE: Brand: 3M™ TEGADERM™

## (undated) DEVICE — SUTURE VCRL SZ 2-0 L18IN ABSRB UD CT-1 L36MM 1/2 CIR J839D

## (undated) DEVICE — DRAPE C ARM UNIV W41XL74IN CLR PLAS XR VELC CLSR POLY STRP

## (undated) DEVICE — BLANKET WRM W29.9XL79.1IN UP BODY FORC AIR MISTRAL-AIR

## (undated) DEVICE — PROTECTOR EYE PT SELF ADH NS OPT GRD LF

## (undated) DEVICE — STERILE LATEX POWDER-FREE SURGICAL GLOVESWITH NITRILE AND EMOLLIENT COATINGS: Brand: PROTEXIS

## (undated) DEVICE — POSITIONER HD SFT TCH BERRY FOAM DEVON

## (undated) DEVICE — SUTURE VCRL SZ 0 L18IN ABSRB UD L36MM CT-1 1/2 CIR J840D

## (undated) DEVICE — SUTURE MCRYL SZ 4-0 L27IN ABSRB UD L19MM PS-2 1/2 CIR PRIM Y426H

## (undated) DEVICE — ARM CRADLE: Brand: DEVON

## (undated) DEVICE — TOWEL,OR,DSP,ST,BLUE,STD,4/PK,20PK/CS: Brand: MEDLINE

## (undated) DEVICE — SURGICAL PROCEDURE PACK NEURO DRP CUST

## (undated) DEVICE — MERCY FAIRFIELD TURNOVER KIT: Brand: MEDLINE INDUSTRIES, INC.

## (undated) DEVICE — DRAPE MICSCP W132XL406CM LENS DIA68MM W VARI LENS2 FOR LEICA

## (undated) DEVICE — Device

## (undated) DEVICE — GAUZE,SPONGE,4"X4",8PLY,STRL,LF,10/TRAY: Brand: MEDLINE

## (undated) DEVICE — MASTISOL ADHESIVE LIQ 2/3ML

## (undated) DEVICE — ELECTRODE PT RET AD L9FT HI MOIST COND ADH HYDRGEL CORDED